# Patient Record
Sex: MALE | Race: WHITE | NOT HISPANIC OR LATINO | URBAN - METROPOLITAN AREA
[De-identification: names, ages, dates, MRNs, and addresses within clinical notes are randomized per-mention and may not be internally consistent; named-entity substitution may affect disease eponyms.]

---

## 2019-05-21 NOTE — PATIENT DISCUSSION
Ectropion Counseling:  I have examined the patient today and found them to have ectropion due to laxity of the lower eyelid. I have discussed at length the anatomy and pathophysiology of the problem. The risks and benefits of a lateral tarsal strip were discussed in detail as well as the location of the incision and the recovery process. The patient understands and desires a lateral tarsal strip procedure to correct the problem.

## 2019-05-21 NOTE — PATIENT DISCUSSION
PHOTOGRAPHS: I have reviewed the external ocular photographs of this patient which show the following: significant ectropion both lower eyelids and significant ptosis both upper eyelids.

## 2019-08-02 NOTE — PATIENT DISCUSSION
Also, please do not hesitate to call us if you have any concerns not addressed by this information. Please call [phone] and we will do everything we can to help you during this period.

## 2022-01-26 ENCOUNTER — ESTABLISHED PATIENT (OUTPATIENT)
Dept: URBAN - METROPOLITAN AREA CLINIC 19 | Facility: CLINIC | Age: 67
End: 2022-01-26

## 2022-01-26 DIAGNOSIS — H40.053: ICD-10-CM

## 2022-01-26 PROCEDURE — 92133 CPTRZD OPH DX IMG PST SGM ON: CPT

## 2022-01-26 PROCEDURE — 92083 EXTENDED VISUAL FIELD XM: CPT

## 2022-01-26 PROCEDURE — 99213 OFFICE O/P EST LOW 20 MIN: CPT

## 2022-01-26 ASSESSMENT — TONOMETRY
OS_IOP_MMHG: 12
OD_IOP_MMHG: 16
OD_IOP_MMHG: 13
OS_IOP_MMHG: 16

## 2022-01-26 ASSESSMENT — VISUAL ACUITY
OD_SC: 20/20
OU_CC: 20/20
OS_SC: 20/20

## 2022-05-23 ENCOUNTER — FOLLOW UP (OUTPATIENT)
Dept: URBAN - METROPOLITAN AREA CLINIC 19 | Facility: CLINIC | Age: 67
End: 2022-05-23

## 2022-05-23 DIAGNOSIS — H40.053: ICD-10-CM

## 2022-05-23 PROCEDURE — 99213 OFFICE O/P EST LOW 20 MIN: CPT

## 2022-05-23 ASSESSMENT — TONOMETRY
OS_IOP_MMHG: 9
OD_IOP_MMHG: 10
OS_IOP_MMHG: 14
OD_IOP_MMHG: 14

## 2022-05-23 ASSESSMENT — VISUAL ACUITY
OD_CC: 20/30-3
OU_CC: 20/20
OS_CC: 20/30-1

## 2022-11-16 ENCOUNTER — ESTABLISHED PATIENT (OUTPATIENT)
Dept: URBAN - METROPOLITAN AREA CLINIC 19 | Facility: CLINIC | Age: 67
End: 2022-11-16

## 2022-11-16 DIAGNOSIS — H52.4: ICD-10-CM

## 2022-11-16 DIAGNOSIS — H25.813: ICD-10-CM

## 2022-11-16 DIAGNOSIS — H40.053: ICD-10-CM

## 2022-11-16 PROCEDURE — 92014 COMPRE OPH EXAM EST PT 1/>: CPT

## 2022-11-16 PROCEDURE — 92250 FUNDUS PHOTOGRAPHY W/I&R: CPT

## 2022-11-16 ASSESSMENT — KERATOMETRY
OS_K2POWER_DIOPTERS: 44.25
OD_AXISANGLE_DEGREES: 35
OD_AXISANGLE2_DEGREES: 125
OS_AXISANGLE2_DEGREES: 74
OS_K1POWER_DIOPTERS: 43.50
OD_K1POWER_DIOPTERS: 43.50
OD_K2POWER_DIOPTERS: 44.00
OS_AXISANGLE_DEGREES: 164

## 2022-11-16 ASSESSMENT — VISUAL ACUITY
OD_CC: 20/25-2
OU_CC: 20/20-3
OU_CC: J1+
OS_CC: 20/20-3

## 2022-11-16 ASSESSMENT — TONOMETRY
OD_IOP_MMHG: 11
OS_IOP_MMHG: 13

## 2023-05-15 ENCOUNTER — FOLLOW UP (OUTPATIENT)
Dept: URBAN - METROPOLITAN AREA CLINIC 19 | Facility: CLINIC | Age: 68
End: 2023-05-15

## 2023-05-15 DIAGNOSIS — H40.053: ICD-10-CM

## 2023-05-15 PROCEDURE — 92133 CPTRZD OPH DX IMG PST SGM ON: CPT

## 2023-05-15 PROCEDURE — 92083 EXTENDED VISUAL FIELD XM: CPT

## 2023-05-15 PROCEDURE — 99213 OFFICE O/P EST LOW 20 MIN: CPT

## 2023-05-15 ASSESSMENT — TONOMETRY
OS_IOP_MMHG: 14
OS_IOP_MMHG: 14
OD_IOP_MMHG: 13
OD_IOP_MMHG: 14

## 2023-05-15 ASSESSMENT — KERATOMETRY
OS_AXISANGLE_DEGREES: 164
OS_K2POWER_DIOPTERS: 44.25
OD_AXISANGLE2_DEGREES: 125
OD_K1POWER_DIOPTERS: 43.50
OS_K1POWER_DIOPTERS: 43.50
OD_AXISANGLE_DEGREES: 35
OS_AXISANGLE2_DEGREES: 74
OD_K2POWER_DIOPTERS: 44.00

## 2023-05-15 ASSESSMENT — VISUAL ACUITY
OD_CC: 20/20
OS_CC: 20/20

## 2023-11-17 ENCOUNTER — ESTABLISHED PATIENT (OUTPATIENT)
Dept: URBAN - METROPOLITAN AREA CLINIC 19 | Facility: CLINIC | Age: 68
End: 2023-11-17

## 2023-11-17 DIAGNOSIS — H43.813: ICD-10-CM

## 2023-11-17 DIAGNOSIS — H52.4: ICD-10-CM

## 2023-11-17 DIAGNOSIS — H40.053: ICD-10-CM

## 2023-11-17 DIAGNOSIS — H25.813: ICD-10-CM

## 2023-11-17 PROCEDURE — 92015 DETERMINE REFRACTIVE STATE: CPT

## 2023-11-17 PROCEDURE — 92014 COMPRE OPH EXAM EST PT 1/>: CPT

## 2023-11-17 ASSESSMENT — VISUAL ACUITY
OS_CC: 20/20-1
OU_CC: 20/20-2
OD_CC: 20/20-1

## 2023-11-17 ASSESSMENT — KERATOMETRY
OD_AXISANGLE_DEGREES: 140
OS_K2POWER_DIOPTERS: 44.25
OS_AXISANGLE2_DEGREES: 55
OS_K1POWER_DIOPTERS: 44
OD_K1POWER_DIOPTERS: 44
OD_AXISANGLE2_DEGREES: 50
OS_AXISANGLE_DEGREES: 145
OD_K2POWER_DIOPTERS: 44.25

## 2023-11-17 ASSESSMENT — TONOMETRY
OD_IOP_MMHG: 15
OS_IOP_MMHG: 11

## 2024-05-21 ENCOUNTER — FOLLOW UP (OUTPATIENT)
Dept: URBAN - METROPOLITAN AREA CLINIC 19 | Facility: CLINIC | Age: 69
End: 2024-05-21

## 2024-05-21 DIAGNOSIS — H40.053: ICD-10-CM

## 2024-05-21 PROCEDURE — 92133 CPTRZD OPH DX IMG PST SGM ON: CPT

## 2024-05-21 PROCEDURE — 99212 OFFICE O/P EST SF 10 MIN: CPT

## 2024-05-21 PROCEDURE — 92083 EXTENDED VISUAL FIELD XM: CPT

## 2024-05-21 ASSESSMENT — VISUAL ACUITY
OD_CC: 20/20
OS_CC: 20/20
OU_CC: 20/20

## 2024-05-21 ASSESSMENT — TONOMETRY
OD_IOP_MMHG: 14
OD_IOP_MMHG: 17
OS_IOP_MMHG: 12
OS_IOP_MMHG: 16

## 2024-12-11 ENCOUNTER — COMPREHENSIVE EXAM (OUTPATIENT)
Age: 69
End: 2024-12-11

## 2024-12-11 DIAGNOSIS — H25.813: ICD-10-CM

## 2024-12-11 DIAGNOSIS — H52.4: ICD-10-CM

## 2024-12-11 DIAGNOSIS — H40.053: ICD-10-CM

## 2024-12-11 DIAGNOSIS — H43.813: ICD-10-CM

## 2024-12-11 PROCEDURE — 92014 COMPRE OPH EXAM EST PT 1/>: CPT

## 2024-12-11 PROCEDURE — 92250 FUNDUS PHOTOGRAPHY W/I&R: CPT

## 2024-12-11 PROCEDURE — 92015 DETERMINE REFRACTIVE STATE: CPT

## 2025-06-12 ENCOUNTER — FOLLOW UP (OUTPATIENT)
Age: 70
End: 2025-06-12

## 2025-06-12 DIAGNOSIS — H40.053: ICD-10-CM

## 2025-06-12 PROCEDURE — 92133 CPTRZD OPH DX IMG PST SGM ON: CPT

## 2025-06-12 PROCEDURE — 99212 OFFICE O/P EST SF 10 MIN: CPT

## 2025-06-12 PROCEDURE — 92083 EXTENDED VISUAL FIELD XM: CPT
